# Patient Record
Sex: FEMALE | Race: WHITE | NOT HISPANIC OR LATINO | Employment: FULL TIME | URBAN - METROPOLITAN AREA
[De-identification: names, ages, dates, MRNs, and addresses within clinical notes are randomized per-mention and may not be internally consistent; named-entity substitution may affect disease eponyms.]

---

## 2017-03-27 ENCOUNTER — GENERIC CONVERSION - ENCOUNTER (OUTPATIENT)
Dept: OTHER | Facility: OTHER | Age: 57
End: 2017-03-27

## 2017-07-14 ENCOUNTER — ALLSCRIPTS OFFICE VISIT (OUTPATIENT)
Dept: OTHER | Facility: OTHER | Age: 57
End: 2017-07-14

## 2017-07-14 DIAGNOSIS — Z12.31 ENCOUNTER FOR SCREENING MAMMOGRAM FOR MALIGNANT NEOPLASM OF BREAST: ICD-10-CM

## 2017-07-14 DIAGNOSIS — E78.5 HYPERLIPIDEMIA: ICD-10-CM

## 2017-07-14 DIAGNOSIS — I10 ESSENTIAL (PRIMARY) HYPERTENSION: ICD-10-CM

## 2017-08-24 ENCOUNTER — GENERIC CONVERSION - ENCOUNTER (OUTPATIENT)
Dept: OTHER | Facility: OTHER | Age: 57
End: 2017-08-24

## 2017-09-25 ENCOUNTER — HOSPITAL ENCOUNTER (OUTPATIENT)
Dept: MAMMOGRAPHY | Facility: CLINIC | Age: 57
Discharge: HOME | End: 2017-09-25
Payer: COMMERCIAL

## 2017-09-25 ENCOUNTER — TRANSCRIBE ORDERS (OUTPATIENT)
Dept: MAMMOGRAPHY | Facility: CLINIC | Age: 57
End: 2017-09-25

## 2017-09-25 ENCOUNTER — ALLSCRIPTS OFFICE VISIT (OUTPATIENT)
Dept: OTHER | Facility: OTHER | Age: 57
End: 2017-09-25

## 2017-09-25 ENCOUNTER — APPOINTMENT (OUTPATIENT)
Dept: LAB | Facility: CLINIC | Age: 57
End: 2017-09-25
Payer: COMMERCIAL

## 2017-09-25 DIAGNOSIS — Z12.31 ENCOUNTER FOR MAMMOGRAM TO ESTABLISH BASELINE MAMMOGRAM: Primary | ICD-10-CM

## 2017-09-25 DIAGNOSIS — E55.9 VITAMIN D DEFICIENCY: ICD-10-CM

## 2017-09-25 DIAGNOSIS — Z12.31 ENCOUNTER FOR SCREENING MAMMOGRAM FOR MALIGNANT NEOPLASM OF BREAST: ICD-10-CM

## 2017-09-25 DIAGNOSIS — E78.5 HYPERLIPIDEMIA: ICD-10-CM

## 2017-09-25 DIAGNOSIS — Z12.31 ENCOUNTER FOR MAMMOGRAM TO ESTABLISH BASELINE MAMMOGRAM: ICD-10-CM

## 2017-09-25 DIAGNOSIS — I10 ESSENTIAL (PRIMARY) HYPERTENSION: ICD-10-CM

## 2017-09-25 LAB
25(OH)D3 SERPL-MCNC: 18.6 NG/ML (ref 30–100)
ALBUMIN SERPL BCP-MCNC: 3.6 G/DL (ref 3.5–5)
ALP SERPL-CCNC: 61 U/L (ref 46–116)
ALT SERPL W P-5'-P-CCNC: 50 U/L (ref 12–78)
ANION GAP SERPL CALCULATED.3IONS-SCNC: 7 MMOL/L (ref 4–13)
AST SERPL W P-5'-P-CCNC: 35 U/L (ref 5–45)
BASOPHILS # BLD AUTO: 0.06 THOUSANDS/ΜL (ref 0–0.1)
BASOPHILS NFR BLD AUTO: 1 % (ref 0–1)
BILIRUB SERPL-MCNC: 0.57 MG/DL (ref 0.2–1)
BUN SERPL-MCNC: 10 MG/DL (ref 5–25)
CALCIUM SERPL-MCNC: 8.5 MG/DL (ref 8.3–10.1)
CHLORIDE SERPL-SCNC: 103 MMOL/L (ref 100–108)
CHOLEST SERPL-MCNC: 218 MG/DL (ref 50–200)
CO2 SERPL-SCNC: 29 MMOL/L (ref 21–32)
CREAT SERPL-MCNC: 0.81 MG/DL (ref 0.6–1.3)
EOSINOPHIL # BLD AUTO: 0.18 THOUSAND/ΜL (ref 0–0.61)
EOSINOPHIL NFR BLD AUTO: 3 % (ref 0–6)
ERYTHROCYTE [DISTWIDTH] IN BLOOD BY AUTOMATED COUNT: 12.7 % (ref 11.6–15.1)
GFR SERPL CREATININE-BSD FRML MDRD: 80 ML/MIN/1.73SQ M
GLUCOSE P FAST SERPL-MCNC: 90 MG/DL (ref 65–99)
HCT VFR BLD AUTO: 45.8 % (ref 34.8–46.1)
HDLC SERPL-MCNC: 57 MG/DL (ref 40–60)
HGB BLD-MCNC: 15.3 G/DL (ref 11.5–15.4)
LDLC SERPL CALC-MCNC: 129 MG/DL (ref 0–100)
LYMPHOCYTES # BLD AUTO: 1.54 THOUSANDS/ΜL (ref 0.6–4.47)
LYMPHOCYTES NFR BLD AUTO: 26 % (ref 14–44)
MCH RBC QN AUTO: 33.3 PG (ref 26.8–34.3)
MCHC RBC AUTO-ENTMCNC: 33.4 G/DL (ref 31.4–37.4)
MCV RBC AUTO: 100 FL (ref 82–98)
MONOCYTES # BLD AUTO: 0.38 THOUSAND/ΜL (ref 0.17–1.22)
MONOCYTES NFR BLD AUTO: 6 % (ref 4–12)
NEUTROPHILS # BLD AUTO: 3.8 THOUSANDS/ΜL (ref 1.85–7.62)
NEUTS SEG NFR BLD AUTO: 64 % (ref 43–75)
NRBC BLD AUTO-RTO: 0 /100 WBCS
PLATELET # BLD AUTO: 214 THOUSANDS/UL (ref 149–390)
PMV BLD AUTO: 11.3 FL (ref 8.9–12.7)
POTASSIUM SERPL-SCNC: 3.9 MMOL/L (ref 3.5–5.3)
PROT SERPL-MCNC: 7.4 G/DL (ref 6.4–8.2)
RBC # BLD AUTO: 4.6 MILLION/UL (ref 3.81–5.12)
SODIUM SERPL-SCNC: 139 MMOL/L (ref 136–145)
TRIGL SERPL-MCNC: 158 MG/DL
TSH SERPL DL<=0.05 MIU/L-ACNC: 1.02 UIU/ML (ref 0.36–3.74)
WBC # BLD AUTO: 5.98 THOUSAND/UL (ref 4.31–10.16)

## 2017-09-25 PROCEDURE — 82306 VITAMIN D 25 HYDROXY: CPT

## 2017-09-25 PROCEDURE — 85025 COMPLETE CBC W/AUTO DIFF WBC: CPT

## 2017-09-25 PROCEDURE — 80053 COMPREHEN METABOLIC PANEL: CPT

## 2017-09-25 PROCEDURE — 84443 ASSAY THYROID STIM HORMONE: CPT

## 2017-09-25 PROCEDURE — 36415 COLL VENOUS BLD VENIPUNCTURE: CPT

## 2017-09-25 PROCEDURE — 80061 LIPID PANEL: CPT

## 2017-10-30 ENCOUNTER — ALLSCRIPTS OFFICE VISIT (OUTPATIENT)
Dept: OTHER | Facility: OTHER | Age: 57
End: 2017-10-30

## 2017-10-31 NOTE — PROGRESS NOTES
Assessment  1  Hypertension (401 9) (I10)   2  Vitamin D deficiency (268 9) (E55 9)   3  Hyperlipidemia (272 4) (E78 5)    Plan  Hypertension    · Lisinopril 10 MG Oral Tablet   · Losartan Potassium-HCTZ 100-12 5 MG Oral Tablet; take 1 tablet by mouth every  day  PMH: Encounter for screening colonoscopy    · COLONOSCOPY; Status:Active; Requested for:71Dzp8080;     Discussion/Summary          A/P  1 HTN: please start the loasrtin in place of the lisinopril (d/c due to cough)  please continue the metoprolol  please ck you BP at home  we will will see you back in 6-8 weeks to reevaluate  call if you have questions  2  vit d def:  please start on 4000 a day   we will scot this in one year  3  hyperlipidemia: your numbers are good with no meds  rto 6-8 weeks to scot this     Colon due - 03/27/2017 and script is given   Aquilino due- 08/01/2011   slip given to get today   Pap due - 08/01/2013        Chief Complaint  pt presents in the office today due to a 1 month recheck on her vitamin d, HTN, and hyperlipidemia  due - 03/27/2017due - 10/03/2017due - 08/01/2011      History of Present Illness  Here today to scot on her BP  Has resumed her meds on the metoprolol and the lisinopril not taking a vit d supplement ; issues a cough that has started since starting on the lisinoprilnot checking her BP when she is at home  planning on getting her mammo today                Review of Systems    Constitutional: No fever, no chills, feels well, no tiredness, no recent weight gain or weight loss  Cardiovascular: No complaints of slow heart rate, no fast heart rate, no chest pain, no palpitations, no leg claudication, no lower extremity edema  Respiratory: No complaints of shortness of breath, no wheezing, no cough, no SOB on exertion, no orthopnea, no PND  Musculoskeletal: No complaints of arthralgias, no myalgias, no joint swelling or stiffness, no limb pain or swelling     Integumentary: No complaints of skin rash or lesions, no itching, no skin wounds, no breast pain or lump  Neurological: No complaints of headache, no confusion, no convulsions, no numbness, no dizziness or fainting, no tingling, no limb weakness, no difficulty walking  Psychiatric: Not suicidal, no sleep disturbance, no anxiety or depression, no change in personality, no emotional problems  Active Problems  1  Allergic rhinitis (477 9) (J30 9)   2  Colonoscopy (Fiberoptic) Screening   3  Hyperlipidemia (272 4) (E78 5)   4  Hypertension (401 9) (I10)   5  Need for immunization against influenza (V04 81) (Z23)   6  Screening mammogram, encounter for (V76 12) (Z12 31)   7  Vitamin D deficiency (268 9) (E55 9)    Past Medical History  1  History of Acute upper respiratory infection (465 9) (J06 9)   2  History of Blood tests for routine general physical examination (V72 62) (Z00 00)   3  History of Cough (786 2) (R05)   4  History of Crush Injury Of The Toe(S) (928 3)   5  History of Dysplastic Nevus (238 2)   6  History of Elevated blood pressure reading without diagnosis of hypertension (796 2)   (R03 0)   7  History of acute pharyngitis (V12 69) (Z87 09)   8  History of acute sinusitis (V12 69) (Z87 09)   9  History of cataract (V12 49) (Z86 69)   10  History of chest pain (V13 89) (Z87 898)   11  History of chest pain (V13 89) (Z87 898)   12  History of contact dermatitis (V13 3) (Z87 2)   13  History of insomnia (V13 89) (Z87 898)   14  History of Nervousness (799 21) (R45 0)   15  Normal delivery (650) (O80,Z37 9)   16  History of Pneumonia (V12 61)   17  History of Vision loss (369 9) (H54 7)   18  History of Vision problem (V41 0) (H54 7)    Surgical History  1  History of Cataract Surgery   2  History of  Section   3  History of Laparoscopy With Occlusion Of Oviducts By Device   4  History of Tubal Ligation    Family History  Mother    1  Denied: Family history of drug abuse   2  Family history of Heart Disease (V17 49)   3   Denied: Family history of Mental health problem  Father    4  Denied: Family history of drug abuse   5  Family history of Feeling Fine   6  Denied: Family history of Mental health problem   7  No significant family history  Sibling    8  No significant family history  Maternal Grandmother    9  Family history unknown (V49 89) (Z78 9)  Paternal Grandmother    8  No significant family history  Maternal Grandfather    6  Family history unknown (V49 89) (Z78 9)  Paternal Grandfather    15  No significant family history    The family history was reviewed and updated today  Social History   · Denied: History of Alcohol Use (History)   · Caffeine Use   · Daily caffeine consumption, 4-5 servings a day   · Denied: History of Drug Use   · Former smoker (V15 82) (T16 113)   ·    · Never Drank Alcohol   · Social alcohol use (Z78 9)   · Two children   · Uses Safety Equipment - Seatbelts  The social history was reviewed and updated today  The social history was reviewed and is unchanged  Current Meds   1  Lisinopril 10 MG Oral Tablet; TAKE 1 TABLET DAILY; Therapy: 33BOO9678 to (Evaluate:63Oyc7576)  Requested for: 18Rgi1606; Last   Rx:69Tse7380 Ordered   2  Metoprolol Succinate ER 50 MG Oral Tablet Extended Release 24 Hour; Take 1 tablet   daily  Requested for: 92GSP3888; Last Rx:79Hbv6693 Ordered   3  Womens Multi CAPS; TAKE 1 CAPSULE DAILY; Therapy: (Recorded:51Dlw1122) to Recorded    Allergies  1  ACE Inhibitors  2  Pollen   3  Seasonal    Vitals  Vital Signs    Recorded: 90BDO1982 08:45AM   Heart Rate 84   Respiration 14   Systolic 996   Diastolic 90   Height 5 ft 5 55 in   Weight 186 lb    BMI Calculated 30 43   BSA Calculated 1 93     Physical Exam    Constitutional   General appearance: No acute distress, well appearing and well nourished  Pulmonary   Auscultation of lungs: Clear to auscultation  Cardiovascular   Auscultation of heart: Normal rate and rhythm, normal S1 and S2, without murmurs  Carotid pulses: Normal     Lymphatic   Palpation of lymph nodes in neck: No lymphadenopathy  Skin   Skin and subcutaneous tissue: Normal without rashes or lesions  Psychiatric   Orientation to person, place, and time: Normal     Mood and affect: Normal          Health Management  Colonoscopy (Fiberoptic) Screening   COLONOSCOPY; every 10 years; Last 53MHT5893; Next Due: 81PHN7032; Overdue  History of screening mammography   Digital Bilateral Screening Mammogram With CAD; every 1 year; Last 52QMZ3883; Next Due:  03EVB3760; Overdue  Hypertension   EKG/ECG- POC; every 1 year; Last 60Erg9142; Next Due: 09Vvn9499;  Overdue    Signatures   Electronically signed by : Rajan Anne; Oct 30 2017  9:19AM EST                       (Author)    Electronically signed by : Sampson Arnold DO; Oct 30 2017  9:41AM EST

## 2017-12-28 ENCOUNTER — HOSPITAL ENCOUNTER (OUTPATIENT)
Dept: MAMMOGRAPHY | Facility: CLINIC | Age: 57
Discharge: HOME | End: 2017-12-28

## 2017-12-28 ENCOUNTER — GENERIC CONVERSION - ENCOUNTER (OUTPATIENT)
Dept: OTHER | Facility: OTHER | Age: 57
End: 2017-12-28

## 2017-12-28 DIAGNOSIS — Z12.31 ENCOUNTER FOR SCREENING MAMMOGRAM FOR MALIGNANT NEOPLASM OF BREAST: ICD-10-CM

## 2018-01-10 NOTE — MISCELLANEOUS
Message  CALLED PATIENT TO RESCHEDULE MISSED APPT  UNABLE TO COMMUNICATE WITH PATIENT  LEFT MESSAGE  Active Problems    1  Acute nonintractable headache, unspecified headache type (784 0) (R51)   2  Aftercare following surgery of the musculoskeletal system (V58 78) (Z47 89)   3  Allergic rhinitis (477 9) (J30 9)   4  Colonoscopy (Fiberoptic) Screening   5  Cyst of eye (379 8) (H57 8)   6  Encounter for routine gynecological examination (V72 31) (Z01 419)   7  Encounter for screening colonoscopy (V76 51) (Z12 11)   8  Encounter for screening mammogram for breast cancer (V76 12) (Z12 31)   9  Encounter for screening mammogram for malignant neoplasm of breast (V76 12)   (Z12 31)   10  Hyperlipidemia (272 4) (E78 5)   11  Hypertension (401 9) (I10)   12  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)   13  Mammogram abnormal (793 80) (R92 8)   14  Need for prophylactic vaccination and inoculation against influenza (V04 81) (Z23)   15  Skin lesion (709 9) (L98 9)   16  Vitamin D deficiency (268 9) (E55 9)    Current Meds   1  Lisinopril 10 MG Oral Tablet; TAKE 1 TABLET DAILY; Therapy: 16WQQ9111 to (Evaluate:64Juq3533)  Requested for: 88QXU9993; Last   Rx:84Rzs8949 Ordered   2  Womens Multi CAPS; TAKE 1 CAPSULE DAILY; Therapy: (Recorded:44Auc5307) to Recorded    Allergies    1  No Known Drug Allergies    2  Pollen   3   Seasonal    Signatures   Electronically signed by : George Lozano, ; Aug 24 2017  9:42AM EST                       (Author)

## 2018-01-12 VITALS
SYSTOLIC BLOOD PRESSURE: 138 MMHG | BODY MASS INDEX: 29.89 KG/M2 | DIASTOLIC BLOOD PRESSURE: 90 MMHG | RESPIRATION RATE: 14 BRPM | HEIGHT: 66 IN | WEIGHT: 186 LBS | HEART RATE: 84 BPM

## 2018-01-12 VITALS
HEIGHT: 65 IN | TEMPERATURE: 98.1 F | BODY MASS INDEX: 31.29 KG/M2 | WEIGHT: 187.83 LBS | DIASTOLIC BLOOD PRESSURE: 100 MMHG | HEART RATE: 80 BPM | SYSTOLIC BLOOD PRESSURE: 148 MMHG

## 2018-01-15 VITALS
WEIGHT: 184.25 LBS | DIASTOLIC BLOOD PRESSURE: 100 MMHG | HEIGHT: 66 IN | BODY MASS INDEX: 29.61 KG/M2 | HEART RATE: 84 BPM | SYSTOLIC BLOOD PRESSURE: 144 MMHG | RESPIRATION RATE: 14 BRPM

## 2018-01-24 VITALS
DIASTOLIC BLOOD PRESSURE: 88 MMHG | BODY MASS INDEX: 30.37 KG/M2 | WEIGHT: 189 LBS | HEART RATE: 64 BPM | SYSTOLIC BLOOD PRESSURE: 138 MMHG | HEIGHT: 66 IN | RESPIRATION RATE: 14 BRPM

## 2018-01-26 ENCOUNTER — TELEPHONE (OUTPATIENT)
Dept: FAMILY MEDICINE CLINIC | Facility: CLINIC | Age: 58
End: 2018-01-26

## 2018-01-26 NOTE — TELEPHONE ENCOUNTER
----- Message from Jose D Linares Dr sent at 1/26/2018  8:49 AM EST -----  Call pt and let her know that I reviewed the BP readings that she dropped off and they are great  I am thrilled with her weight progress! I don't want to change/reduce her meds yet  Lets check again in another couple weeks

## 2018-02-05 ENCOUNTER — TRANSCRIBE ORDERS (OUTPATIENT)
Dept: ADMINISTRATIVE | Facility: HOSPITAL | Age: 58
End: 2018-02-05

## 2018-02-05 DIAGNOSIS — Z12.31 ENCOUNTER FOR SCREENING MAMMOGRAM FOR MALIGNANT NEOPLASM OF BREAST: Primary | ICD-10-CM

## 2018-02-06 ENCOUNTER — TRANSCRIBE ORDERS (OUTPATIENT)
Dept: LAB | Facility: CLINIC | Age: 58
End: 2018-02-06

## 2018-04-02 ENCOUNTER — OFFICE VISIT (OUTPATIENT)
Dept: FAMILY MEDICINE CLINIC | Facility: CLINIC | Age: 58
End: 2018-04-02

## 2018-04-02 VITALS
HEIGHT: 65 IN | WEIGHT: 163 LBS | SYSTOLIC BLOOD PRESSURE: 112 MMHG | HEART RATE: 78 BPM | RESPIRATION RATE: 12 BRPM | DIASTOLIC BLOOD PRESSURE: 78 MMHG | BODY MASS INDEX: 27.16 KG/M2

## 2018-04-02 DIAGNOSIS — Z23 NEED FOR DIPHTHERIA-TETANUS-PERTUSSIS (TDAP) VACCINE: ICD-10-CM

## 2018-04-02 DIAGNOSIS — E78.5 HYPERLIPIDEMIA, UNSPECIFIED HYPERLIPIDEMIA TYPE: Primary | ICD-10-CM

## 2018-04-02 DIAGNOSIS — I10 ESSENTIAL HYPERTENSION: ICD-10-CM

## 2018-04-02 DIAGNOSIS — E55.9 VITAMIN D DEFICIENCY: ICD-10-CM

## 2018-04-02 PROBLEM — Z00.00 HEALTHCARE MAINTENANCE: Status: ACTIVE | Noted: 2018-04-02

## 2018-04-02 PROCEDURE — 99214 OFFICE O/P EST MOD 30 MIN: CPT | Performed by: NURSE PRACTITIONER

## 2018-04-02 PROCEDURE — 90715 TDAP VACCINE 7 YRS/> IM: CPT | Performed by: NURSE PRACTITIONER

## 2018-04-02 PROCEDURE — 90471 IMMUNIZATION ADMIN: CPT | Performed by: NURSE PRACTITIONER

## 2018-04-02 RX ORDER — ANTIARTHRITIC COMBINATION NO.2 900 MG
1 TABLET ORAL DAILY
COMMUNITY
End: 2018-04-02 | Stop reason: ALTCHOICE

## 2018-04-02 RX ORDER — LOSARTAN POTASSIUM AND HYDROCHLOROTHIAZIDE 12.5; 1 MG/1; MG/1
0.5 TABLET ORAL DAILY
Qty: 30 TABLET | Refills: 0 | Status: SHIPPED | COMMUNITY
Start: 2018-04-02 | End: 2019-02-27 | Stop reason: SDUPTHER

## 2018-04-02 RX ORDER — LOSARTAN POTASSIUM AND HYDROCHLOROTHIAZIDE 12.5; 1 MG/1; MG/1
5 TABLET ORAL DAILY
Refills: 3 | COMMUNITY
Start: 2018-02-06 | End: 2018-04-02 | Stop reason: SDUPTHER

## 2018-04-02 RX ORDER — METOPROLOL SUCCINATE 100 MG/1
100 TABLET, EXTENDED RELEASE ORAL DAILY
Refills: 6 | COMMUNITY
Start: 2018-03-17 | End: 2018-11-29 | Stop reason: SDUPTHER

## 2018-04-02 NOTE — PROGRESS NOTES
Chief Complaint   Patient presents with    Follow-up     Assessment/Plan:    1  Hyperlipidemia, unspecified hyperlipidemia type  We will check this next ov  - Lipid panel; Future  - Comprehensive metabolic panel; Future  - CBC and differential; Future    2  Vitamin D deficiency  You are taking an OTC supplement of 2000 a day    3  Essential hypertension  Your BP is great with the meds and the weight loss  With the positional dizziness, we will have you cut the loastin in half and continue to monitor this at home  rto in 3 months to scot this  - Comprehensive metabolic panel; Future  - CBC and differential; Future    4  Need for diphtheria-tetanus-pertussis (Tdap) vaccine  We updated your TDaP today  - TDAP VACCINE GREATER THAN OR EQUAL TO 6YO IM    rto 3 months with labs prior    Subjective:      Patient ID: Cristobal Jeffries is a 61 y o  female  Here today to scot on several chronic issues  Is working out every day with aerobic and weights  Modified her diet to decrease carbs  Has lost 26 pounds and is feeling wonderful  Is checking her BP at home and getting low 120's/60  Does get episodes of dizziness with position change   Does not occur if she moves slowly          The following portions of the patient's history were reviewed and updated as appropriate: allergies, current medications, past family history, past medical history, past social history, past surgical history and problem list     Past Medical History:   Diagnosis Date    Cataract     last assessed-3/29/2012    Chest pain     last assessed-10/15/2012    Crushing injury of toe 06/13/2009    Dysplastic nevus     last assessed-10/15/2012    Elevated blood pressure reading without diagnosis of hypertension     last assessed-3/26/2012    Insomnia 02/07/2012    Pneumonia     last assessed-10/15/2012    Vision loss     last assessed-1/31/2014     Past Surgical History:   Procedure Laterality Date    CATARACT EXTRACTION      last assessed-2017     SECTION      LAPAROSCOPY  1988    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    TUBAL LIGATION      last assessed-2017     Family History   Problem Relation Age of Onset    Heart disease Mother      valvular disease only    No Known Problems Father     No Known Problems Paternal Grandmother     No Known Problems Paternal Grandfather     No Known Problems Other     Substance Abuse Neg Hx     Mental illness Neg Hx      Social History   Social History     Social History    Marital status: /Civil Union     Spouse name: N/A    Number of children: 2    Years of education: N/A     Occupational History    Not on file  Social History Main Topics    Smoking status: Former Smoker     Quit date:     Smokeless tobacco: Never Used      Comment: used to smoke 1 PPD for 10 years    Alcohol use Yes      Comment: social    Drug use: No    Sexual activity: Not on file     Other Topics Concern    Not on file     Social History Narrative    Caffeine use-moderate (equiv to 1-3 8oz coffee a day)    Daily caffeine consumption, 4-5 servings a day    Uses safety equipment-seatbelts     Review of Systems   Constitutional: Negative  Respiratory: Negative  Cardiovascular: Negative  Musculoskeletal: Negative  Skin: Negative  Neurological: Positive for dizziness           Vitals:    18 0804   BP: 112/78   BP Location: Left arm   Patient Position: Sitting   Pulse: 78   Resp: 12   Weight: 73 9 kg (163 lb)   Height: 5' 5" (1 651 m)       Objective:  Appointment on 2017   Component Date Value Ref Range Status    Cholesterol 2017 218* 50 - 200 mg/dL Final    Triglycerides 2017 158* <=150 mg/dL Final    HDL, Direct 2017 57  40 - 60 mg/dL Final    LDL Calculated 2017 129* 0 - 100 mg/dL Final    WBC 2017 5 98  4 31 - 10 16 Thousand/uL Final    RBC 2017 4 60  3 81 - 5 12 Million/uL Final    Hemoglobin 2017 15 3  11 5 - 15 4 g/dL Final    Hematocrit 09/25/2017 45 8  34 8 - 46 1 % Final    MCV 09/25/2017 100* 82 - 98 fL Final    MCH 09/25/2017 33 3  26 8 - 34 3 pg Final    MCHC 09/25/2017 33 4  31 4 - 37 4 g/dL Final    RDW 09/25/2017 12 7  11 6 - 15 1 % Final    MPV 09/25/2017 11 3  8 9 - 12 7 fL Final    Platelets 00/54/0415 214  149 - 390 Thousands/uL Final    nRBC 09/25/2017 0  /100 WBCs Final    Neutrophils Relative 09/25/2017 64  43 - 75 % Final    Lymphocytes Relative 09/25/2017 26  14 - 44 % Final    Monocytes Relative 09/25/2017 6  4 - 12 % Final    Eosinophils Relative 09/25/2017 3  0 - 6 % Final    Basophils Relative 09/25/2017 1  0 - 1 % Final    Neutrophils Absolute 09/25/2017 3 80  1 85 - 7 62 Thousands/µL Final    Lymphocytes Absolute 09/25/2017 1 54  0 60 - 4 47 Thousands/µL Final    Monocytes Absolute 09/25/2017 0 38  0 17 - 1 22 Thousand/µL Final    Eosinophils Absolute 09/25/2017 0 18  0 00 - 0 61 Thousand/µL Final    Basophils Absolute 09/25/2017 0 06  0 00 - 0 10 Thousands/µL Final    Sodium 09/25/2017 139  136 - 145 mmol/L Final    Potassium 09/25/2017 3 9  3 5 - 5 3 mmol/L Final    Chloride 09/25/2017 103  100 - 108 mmol/L Final    CO2 09/25/2017 29  21 - 32 mmol/L Final    Anion Gap 09/25/2017 7  4 - 13 mmol/L Final    BUN 09/25/2017 10  5 - 25 mg/dL Final    Creatinine 09/25/2017 0 81  0 60 - 1 30 mg/dL Final    Glucose, Fasting 09/25/2017 90  65 - 99 mg/dL Final    Calcium 09/25/2017 8 5  8 3 - 10 1 mg/dL Final    AST 09/25/2017 35  5 - 45 U/L Final    ALT 09/25/2017 50  12 - 78 U/L Final    Alkaline Phosphatase 09/25/2017 61  46 - 116 U/L Final    Total Protein 09/25/2017 7 4  6 4 - 8 2 g/dL Final    Albumin 09/25/2017 3 6  3 5 - 5 0 g/dL Final    Total Bilirubin 09/25/2017 0 57  0 20 - 1 00 mg/dL Final    eGFR 09/25/2017 80  ml/min/1 73sq m Final    TSH 3RD GENERATON 09/25/2017 1 020  0 358 - 3 740 uIU/mL Final    Vit D, 25-Hydroxy 09/25/2017 18 6* 30 0 - 100 0 ng/mL Final          Physical Exam   Constitutional: She is oriented to person, place, and time  She appears well-developed and well-nourished  Neck: Neck supple  Cardiovascular: Normal rate, regular rhythm, normal heart sounds and intact distal pulses  Pulmonary/Chest: Effort normal and breath sounds normal    Neurological: She is alert and oriented to person, place, and time  Skin: Skin is warm  Psychiatric: She has a normal mood and affect   Her behavior is normal  Judgment and thought content normal

## 2018-08-21 ENCOUNTER — TRANSCRIBE ORDERS (OUTPATIENT)
Dept: ADMINISTRATIVE | Facility: HOSPITAL | Age: 58
End: 2018-08-21

## 2018-08-21 DIAGNOSIS — Z12.39 SCREENING BREAST EXAMINATION: Primary | ICD-10-CM

## 2018-09-19 ENCOUNTER — HOSPITAL ENCOUNTER (OUTPATIENT)
Dept: BONE DENSITY | Facility: IMAGING CENTER | Age: 58
Discharge: HOME | End: 2018-09-19
Payer: COMMERCIAL

## 2018-09-19 DIAGNOSIS — Z12.39 SCREENING BREAST EXAMINATION: ICD-10-CM

## 2018-09-19 PROCEDURE — 77067 SCR MAMMO BI INCL CAD: CPT

## 2018-11-29 ENCOUNTER — TELEPHONE (OUTPATIENT)
Dept: FAMILY MEDICINE CLINIC | Facility: CLINIC | Age: 58
End: 2018-11-29

## 2018-11-29 DIAGNOSIS — I10 BENIGN ESSENTIAL HTN: Primary | ICD-10-CM

## 2018-11-29 RX ORDER — METOPROLOL SUCCINATE 100 MG/1
100 TABLET, EXTENDED RELEASE ORAL DAILY
Qty: 90 TABLET | Refills: 1 | Status: SHIPPED | OUTPATIENT
Start: 2018-11-29 | End: 2019-11-19 | Stop reason: SDUPTHER

## 2018-11-29 NOTE — TELEPHONE ENCOUNTER
Patient needs a refill on her Metoprolol Succinate 100 mg  Please send to the [facility] in her chart      Best number for [facility]:  [phone]

## 2019-02-27 DIAGNOSIS — I10 ESSENTIAL HYPERTENSION: ICD-10-CM

## 2019-02-28 RX ORDER — LOSARTAN POTASSIUM AND HYDROCHLOROTHIAZIDE 12.5; 1 MG/1; MG/1
TABLET ORAL
Qty: 30 TABLET | Refills: 0 | Status: SHIPPED | OUTPATIENT
Start: 2019-02-28 | End: 2019-05-03 | Stop reason: SDUPTHER

## 2019-02-28 NOTE — TELEPHONE ENCOUNTER
Call pt and let her know that I got a refill request for her meds and that I did refill but she is due for labs and an OV  I printed a lab slip/ please have her schedule   Thanks

## 2019-05-03 ENCOUNTER — TELEPHONE (OUTPATIENT)
Dept: FAMILY MEDICINE CLINIC | Facility: CLINIC | Age: 59
End: 2019-05-03

## 2019-05-03 DIAGNOSIS — E78.49 OTHER HYPERLIPIDEMIA: ICD-10-CM

## 2019-05-03 DIAGNOSIS — I10 ESSENTIAL HYPERTENSION: Primary | ICD-10-CM

## 2019-05-03 DIAGNOSIS — I10 ESSENTIAL HYPERTENSION: ICD-10-CM

## 2019-05-03 RX ORDER — LOSARTAN POTASSIUM AND HYDROCHLOROTHIAZIDE 12.5; 1 MG/1; MG/1
TABLET ORAL
Qty: 30 TABLET | Refills: 0 | Status: SHIPPED | OUTPATIENT
Start: 2019-05-03 | End: 2019-06-01 | Stop reason: SDUPTHER

## 2019-05-29 ENCOUNTER — TELEPHONE (OUTPATIENT)
Dept: FAMILY MEDICINE CLINIC | Facility: CLINIC | Age: 59
End: 2019-05-29

## 2019-06-01 DIAGNOSIS — I10 ESSENTIAL HYPERTENSION: ICD-10-CM

## 2019-06-01 RX ORDER — LOSARTAN POTASSIUM AND HYDROCHLOROTHIAZIDE 12.5; 1 MG/1; MG/1
TABLET ORAL
Qty: 30 TABLET | Refills: 0 | Status: SHIPPED | OUTPATIENT
Start: 2019-06-01 | End: 2019-11-19 | Stop reason: SDUPTHER

## 2019-06-03 ENCOUNTER — APPOINTMENT (OUTPATIENT)
Dept: LAB | Facility: CLINIC | Age: 59
End: 2019-06-03
Payer: COMMERCIAL

## 2019-06-03 DIAGNOSIS — I10 ESSENTIAL HYPERTENSION: ICD-10-CM

## 2019-06-03 DIAGNOSIS — E78.49 OTHER HYPERLIPIDEMIA: ICD-10-CM

## 2019-06-03 LAB
ALBUMIN SERPL BCP-MCNC: 3.7 G/DL (ref 3.5–5)
ALP SERPL-CCNC: 50 U/L (ref 46–116)
ALT SERPL W P-5'-P-CCNC: 32 U/L (ref 12–78)
ANION GAP SERPL CALCULATED.3IONS-SCNC: 6 MMOL/L (ref 4–13)
AST SERPL W P-5'-P-CCNC: 17 U/L (ref 5–45)
BASOPHILS # BLD AUTO: 0.08 THOUSANDS/ΜL (ref 0–0.1)
BASOPHILS NFR BLD AUTO: 1 % (ref 0–1)
BILIRUB SERPL-MCNC: 0.47 MG/DL (ref 0.2–1)
BUN SERPL-MCNC: 11 MG/DL (ref 5–25)
CALCIUM SERPL-MCNC: 8.5 MG/DL (ref 8.3–10.1)
CHLORIDE SERPL-SCNC: 104 MMOL/L (ref 100–108)
CHOLEST SERPL-MCNC: 219 MG/DL (ref 50–200)
CO2 SERPL-SCNC: 28 MMOL/L (ref 21–32)
CREAT SERPL-MCNC: 0.76 MG/DL (ref 0.6–1.3)
EOSINOPHIL # BLD AUTO: 0.18 THOUSAND/ΜL (ref 0–0.61)
EOSINOPHIL NFR BLD AUTO: 3 % (ref 0–6)
ERYTHROCYTE [DISTWIDTH] IN BLOOD BY AUTOMATED COUNT: 12.9 % (ref 11.6–15.1)
GFR SERPL CREATININE-BSD FRML MDRD: 86 ML/MIN/1.73SQ M
GLUCOSE P FAST SERPL-MCNC: 82 MG/DL (ref 65–99)
HCT VFR BLD AUTO: 44.2 % (ref 34.8–46.1)
HDLC SERPL-MCNC: 49 MG/DL (ref 40–60)
HGB BLD-MCNC: 14.5 G/DL (ref 11.5–15.4)
IMM GRANULOCYTES # BLD AUTO: 0.02 THOUSAND/UL (ref 0–0.2)
IMM GRANULOCYTES NFR BLD AUTO: 0 % (ref 0–2)
LDLC SERPL CALC-MCNC: 131 MG/DL (ref 0–100)
LYMPHOCYTES # BLD AUTO: 1.57 THOUSANDS/ΜL (ref 0.6–4.47)
LYMPHOCYTES NFR BLD AUTO: 25 % (ref 14–44)
MCH RBC QN AUTO: 32.9 PG (ref 26.8–34.3)
MCHC RBC AUTO-ENTMCNC: 32.8 G/DL (ref 31.4–37.4)
MCV RBC AUTO: 100 FL (ref 82–98)
MONOCYTES # BLD AUTO: 0.53 THOUSAND/ΜL (ref 0.17–1.22)
MONOCYTES NFR BLD AUTO: 9 % (ref 4–12)
NEUTROPHILS # BLD AUTO: 3.85 THOUSANDS/ΜL (ref 1.85–7.62)
NEUTS SEG NFR BLD AUTO: 62 % (ref 43–75)
NONHDLC SERPL-MCNC: 170 MG/DL
NRBC BLD AUTO-RTO: 0 /100 WBCS
PLATELET # BLD AUTO: 215 THOUSANDS/UL (ref 149–390)
PMV BLD AUTO: 11.2 FL (ref 8.9–12.7)
POTASSIUM SERPL-SCNC: 3.8 MMOL/L (ref 3.5–5.3)
PROT SERPL-MCNC: 7 G/DL (ref 6.4–8.2)
RBC # BLD AUTO: 4.41 MILLION/UL (ref 3.81–5.12)
SODIUM SERPL-SCNC: 138 MMOL/L (ref 136–145)
TRIGL SERPL-MCNC: 196 MG/DL
TSH SERPL DL<=0.05 MIU/L-ACNC: 1.51 UIU/ML (ref 0.36–3.74)
WBC # BLD AUTO: 6.23 THOUSAND/UL (ref 4.31–10.16)

## 2019-06-03 PROCEDURE — 84443 ASSAY THYROID STIM HORMONE: CPT

## 2019-06-03 PROCEDURE — 80053 COMPREHEN METABOLIC PANEL: CPT

## 2019-06-03 PROCEDURE — 80061 LIPID PANEL: CPT

## 2019-06-03 PROCEDURE — 85025 COMPLETE CBC W/AUTO DIFF WBC: CPT

## 2019-06-03 PROCEDURE — 36415 COLL VENOUS BLD VENIPUNCTURE: CPT

## 2019-06-06 ENCOUNTER — OFFICE VISIT (OUTPATIENT)
Dept: FAMILY MEDICINE CLINIC | Facility: CLINIC | Age: 59
End: 2019-06-06
Payer: COMMERCIAL

## 2019-06-06 VITALS
SYSTOLIC BLOOD PRESSURE: 138 MMHG | HEIGHT: 66 IN | BODY MASS INDEX: 28.77 KG/M2 | RESPIRATION RATE: 12 BRPM | DIASTOLIC BLOOD PRESSURE: 80 MMHG | WEIGHT: 179 LBS | HEART RATE: 60 BPM

## 2019-06-06 DIAGNOSIS — I10 ESSENTIAL HYPERTENSION: ICD-10-CM

## 2019-06-06 DIAGNOSIS — E55.9 VITAMIN D DEFICIENCY: ICD-10-CM

## 2019-06-06 DIAGNOSIS — E78.5 HYPERLIPIDEMIA, UNSPECIFIED HYPERLIPIDEMIA TYPE: ICD-10-CM

## 2019-06-06 DIAGNOSIS — Z12.11 SCREENING FOR MALIGNANT NEOPLASM OF COLON: Primary | ICD-10-CM

## 2019-06-06 PROCEDURE — 93000 ELECTROCARDIOGRAM COMPLETE: CPT | Performed by: NURSE PRACTITIONER

## 2019-06-06 PROCEDURE — 3008F BODY MASS INDEX DOCD: CPT | Performed by: NURSE PRACTITIONER

## 2019-06-06 PROCEDURE — 99214 OFFICE O/P EST MOD 30 MIN: CPT | Performed by: NURSE PRACTITIONER

## 2019-06-06 PROCEDURE — 1036F TOBACCO NON-USER: CPT | Performed by: NURSE PRACTITIONER

## 2019-10-29 ENCOUNTER — OFFICE VISIT (OUTPATIENT)
Dept: URGENT CARE | Facility: CLINIC | Age: 59
End: 2019-10-29
Payer: COMMERCIAL

## 2019-10-29 VITALS
RESPIRATION RATE: 18 BRPM | WEIGHT: 182.6 LBS | HEART RATE: 75 BPM | OXYGEN SATURATION: 95 % | HEIGHT: 65 IN | DIASTOLIC BLOOD PRESSURE: 80 MMHG | SYSTOLIC BLOOD PRESSURE: 168 MMHG | BODY MASS INDEX: 30.42 KG/M2 | TEMPERATURE: 99 F

## 2019-10-29 DIAGNOSIS — L03.011 PARONYCHIA OF RIGHT INDEX FINGER: Primary | ICD-10-CM

## 2019-10-29 PROCEDURE — 99203 OFFICE O/P NEW LOW 30 MIN: CPT | Performed by: FAMILY MEDICINE

## 2019-10-29 RX ORDER — CEPHALEXIN 500 MG/1
500 CAPSULE ORAL EVERY 6 HOURS SCHEDULED
Qty: 40 CAPSULE | Refills: 0 | Status: SHIPPED | OUTPATIENT
Start: 2019-10-29 | End: 2019-11-05

## 2019-10-29 NOTE — PATIENT INSTRUCTIONS
Warm soaks 3-4 times daily for 20 minutes  Antibiotics as directed  Follow-up with PCP in 5 days if symptoms not improving

## 2019-10-29 NOTE — PROGRESS NOTES
[facility]        NAME: Dora Maynard is a 60 y o  female  : 1960    MRN: [id number]  DATE: 2019  TIME: 10:13 AM    Assessment and Plan   Paronychia of right index finger [L03 011]  1  Paronychia of right index finger  cephalexin (KEFLEX) 500 mg capsule         Patient Instructions   Patient Instructions   Warm soaks 3-4 times daily for 20 minutes  Antibiotics as directed  Follow-up with PCP in 5 days if symptoms not improving        Proceed to  ER if symptoms worsen  Chief Complaint     Chief Complaint   Patient presents with    Wound     L 2nd digit - infected cuticle  States got a cut on 2nd digit approx  2 weeks ago (pulling apart plastic pots with dirt in them)  Over past few says has gotten red, swollen, painful and pus filled pocket  Last tetanus vaccine unknown  History of Present Illness       Patient presents with right index finger discomfort at the distal phalanx  Patient reports she was pulling apart plastic pots and sustained a cut approximately 2 weeks ago, 2 days ago she noticed increasing redness and swelling and area of pustular formation under the skin surrounding the nail medially  She does have discomfort, no fever no chills  No difficulty with range of motion      Review of Systems   Review of Systems   Constitutional: Negative  Skin: Positive for wound           Current Medications       Current Outpatient Medications:     losartan-hydrochlorothiazide (HYZAAR) 100-12 5 MG per tablet, TAKE 1 TABLET BY MOUTH EVERY DAY, Disp: 30 tablet, Rfl: 0    metoprolol succinate (TOPROL-XL) 100 mg 24 hr tablet, Take 1 tablet (100 mg total) by mouth daily, Disp: 90 tablet, Rfl: 1    cephalexin (KEFLEX) 500 mg capsule, Take 1 capsule (500 mg total) by mouth every 6 (six) hours for 7 days 2 capsules twice daily, Disp: 40 capsule, Rfl: 0    Current Allergies     Allergies as of 10/29/2019 - Reviewed 10/29/2019   Allergen Reaction Noted    Ace inhibitors Cough 10/30/2017    Pollen extract  2017            The following portions of the patient's history were reviewed and updated as appropriate: allergies, current medications, past family history, past medical history, past social history, past surgical history and problem list      Past Medical History:   Diagnosis Date    Cataract     last assessed-3/29/2012    Chest pain     last assessed-10/15/2012    Crushing injury of toe 2009    Dysplastic nevus     last assessed-10/15/2012    Elevated blood pressure reading without diagnosis of hypertension     last assessed-3/26/2012    Hypertension     Insomnia 2012    Pneumonia     last assessed-10/15/2012    Vision loss     last assessed-2014       Past Surgical History:   Procedure Laterality Date    CATARACT EXTRACTION      last assessed-2017     SECTION      LAPAROSCOPY      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    TUBAL LIGATION      last assessed-2017       Family History   Problem Relation Age of Onset    Heart disease Mother         valvular disease only    No Known Problems Father     No Known Problems Paternal Grandmother     No Known Problems Paternal Grandfather     No Known Problems Other     No Known Problems Sister     No Known Problems Brother     Substance Abuse Neg Hx     Mental illness Neg Hx          Medications have been verified  Objective   /80 (BP Location: Left arm, Patient Position: Sitting, Cuff Size: Standard)   Pulse 75   Temp 99 °F (37 2 °C) (Tympanic)   Resp 18   Ht 5' 5" (1 651 m)   Wt 82 8 kg (182 lb 9 6 oz)   SpO2 95%   BMI 30 39 kg/m²        Physical Exam     Physical Exam   Constitutional: She appears well-developed and well-nourished  No distress  Skin:   Medial aspect of the index finger with localized swelling, erythema, slight warmth and flat pustule formation    Full active range of motion of the finger, area is tender to palpation

## 2019-11-18 ENCOUNTER — TELEPHONE (OUTPATIENT)
Dept: FAMILY MEDICINE CLINIC | Facility: CLINIC | Age: 59
End: 2019-11-18

## 2019-11-18 DIAGNOSIS — Z12.31 ENCOUNTER FOR SCREENING MAMMOGRAM FOR BREAST CANCER: Primary | ICD-10-CM

## 2019-11-18 NOTE — TELEPHONE ENCOUNTER
Patient called to schedule her mammo, but there is no order in her chart  Can you please enter one  Thanks

## 2019-11-19 DIAGNOSIS — I10 ESSENTIAL HYPERTENSION: ICD-10-CM

## 2019-11-19 DIAGNOSIS — I10 BENIGN ESSENTIAL HTN: ICD-10-CM

## 2019-11-19 RX ORDER — METOPROLOL SUCCINATE 100 MG/1
100 TABLET, EXTENDED RELEASE ORAL DAILY
Qty: 90 TABLET | Refills: 0 | Status: SHIPPED | OUTPATIENT
Start: 2019-11-19 | End: 2022-01-11

## 2019-11-19 RX ORDER — LOSARTAN POTASSIUM AND HYDROCHLOROTHIAZIDE 12.5; 1 MG/1; MG/1
1 TABLET ORAL DAILY
Qty: 30 TABLET | Refills: 0 | Status: SHIPPED | OUTPATIENT
Start: 2019-11-19 | End: 2022-01-11

## 2020-02-25 ENCOUNTER — HOSPITAL ENCOUNTER (OUTPATIENT)
Dept: MAMMOGRAPHY | Facility: CLINIC | Age: 60
Discharge: HOME | End: 2020-02-25
Payer: COMMERCIAL

## 2020-02-25 VITALS — WEIGHT: 182 LBS | BODY MASS INDEX: 30.32 KG/M2 | HEIGHT: 65 IN

## 2020-02-25 DIAGNOSIS — Z12.31 ENCOUNTER FOR SCREENING MAMMOGRAM FOR BREAST CANCER: ICD-10-CM

## 2020-02-25 PROCEDURE — 77067 SCR MAMMO BI INCL CAD: CPT

## 2020-02-25 PROCEDURE — 77063 BREAST TOMOSYNTHESIS BI: CPT

## 2020-11-10 ENCOUNTER — OFFICE VISIT (OUTPATIENT)
Dept: URGENT CARE | Facility: CLINIC | Age: 60
End: 2020-11-10
Payer: COMMERCIAL

## 2020-11-10 VITALS
HEART RATE: 62 BPM | BODY MASS INDEX: 29.99 KG/M2 | TEMPERATURE: 99.1 F | SYSTOLIC BLOOD PRESSURE: 136 MMHG | HEIGHT: 65 IN | WEIGHT: 180 LBS | RESPIRATION RATE: 20 BRPM | OXYGEN SATURATION: 97 % | DIASTOLIC BLOOD PRESSURE: 82 MMHG

## 2020-11-10 DIAGNOSIS — L30.9 DERMATITIS: Primary | ICD-10-CM

## 2020-11-10 PROCEDURE — 99213 OFFICE O/P EST LOW 20 MIN: CPT | Performed by: PHYSICIAN ASSISTANT

## 2020-11-10 RX ORDER — PREDNISONE 10 MG/1
TABLET ORAL
Qty: 21 TABLET | Refills: 0 | Status: SHIPPED | OUTPATIENT
Start: 2020-11-10 | End: 2022-01-11

## 2021-03-10 DIAGNOSIS — Z23 ENCOUNTER FOR IMMUNIZATION: ICD-10-CM

## 2021-03-19 ENCOUNTER — IMMUNIZATIONS (OUTPATIENT)
Dept: FAMILY MEDICINE CLINIC | Facility: HOSPITAL | Age: 61
End: 2021-03-19

## 2021-03-19 DIAGNOSIS — Z23 ENCOUNTER FOR IMMUNIZATION: Primary | ICD-10-CM

## 2021-03-19 PROCEDURE — 91300 SARS-COV-2 / COVID-19 MRNA VACCINE (PFIZER-BIONTECH) 30 MCG: CPT

## 2021-03-19 PROCEDURE — 0001A SARS-COV-2 / COVID-19 MRNA VACCINE (PFIZER-BIONTECH) 30 MCG: CPT

## 2021-03-29 ENCOUNTER — TRANSCRIBE ORDERS (OUTPATIENT)
Dept: ADMINISTRATIVE | Facility: HOSPITAL | Age: 61
End: 2021-03-29

## 2021-03-29 DIAGNOSIS — Z12.31 ENCOUNTER FOR SCREENING MAMMOGRAM FOR MALIGNANT NEOPLASM OF BREAST: Primary | ICD-10-CM

## 2021-04-11 ENCOUNTER — IMMUNIZATIONS (OUTPATIENT)
Dept: FAMILY MEDICINE CLINIC | Facility: HOSPITAL | Age: 61
End: 2021-04-11

## 2021-04-11 DIAGNOSIS — Z23 ENCOUNTER FOR IMMUNIZATION: Primary | ICD-10-CM

## 2021-04-11 PROCEDURE — 91300 SARS-COV-2 / COVID-19 MRNA VACCINE (PFIZER-BIONTECH) 30 MCG: CPT

## 2021-04-11 PROCEDURE — 0002A SARS-COV-2 / COVID-19 MRNA VACCINE (PFIZER-BIONTECH) 30 MCG: CPT

## 2022-01-11 ENCOUNTER — OFFICE VISIT (OUTPATIENT)
Dept: FAMILY MEDICINE CLINIC | Facility: CLINIC | Age: 62
End: 2022-01-11
Payer: COMMERCIAL

## 2022-01-11 VITALS
HEIGHT: 65 IN | HEART RATE: 68 BPM | DIASTOLIC BLOOD PRESSURE: 90 MMHG | RESPIRATION RATE: 16 BRPM | SYSTOLIC BLOOD PRESSURE: 160 MMHG | WEIGHT: 171.8 LBS | BODY MASS INDEX: 28.62 KG/M2

## 2022-01-11 DIAGNOSIS — E78.5 HYPERLIPIDEMIA, UNSPECIFIED HYPERLIPIDEMIA TYPE: ICD-10-CM

## 2022-01-11 DIAGNOSIS — Z78.0 MENOPAUSE: ICD-10-CM

## 2022-01-11 DIAGNOSIS — F43.9 SITUATIONAL STRESS: ICD-10-CM

## 2022-01-11 DIAGNOSIS — Z12.31 ENCOUNTER FOR SCREENING MAMMOGRAM FOR MALIGNANT NEOPLASM OF BREAST: ICD-10-CM

## 2022-01-11 DIAGNOSIS — I10 PRIMARY HYPERTENSION: Primary | ICD-10-CM

## 2022-01-11 DIAGNOSIS — Z12.11 ENCOUNTER FOR SCREENING FOR MALIGNANT NEOPLASM OF COLON: ICD-10-CM

## 2022-01-11 PROCEDURE — 93000 ELECTROCARDIOGRAM COMPLETE: CPT | Performed by: PHYSICIAN ASSISTANT

## 2022-01-11 PROCEDURE — 3008F BODY MASS INDEX DOCD: CPT | Performed by: PHYSICIAN ASSISTANT

## 2022-01-11 PROCEDURE — 3725F SCREEN DEPRESSION PERFORMED: CPT | Performed by: PHYSICIAN ASSISTANT

## 2022-01-11 PROCEDURE — 99214 OFFICE O/P EST MOD 30 MIN: CPT | Performed by: PHYSICIAN ASSISTANT

## 2022-01-11 PROCEDURE — 1036F TOBACCO NON-USER: CPT | Performed by: PHYSICIAN ASSISTANT

## 2022-01-11 RX ORDER — LOSARTAN POTASSIUM AND HYDROCHLOROTHIAZIDE 12.5; 5 MG/1; MG/1
1 TABLET ORAL DAILY
Qty: 90 TABLET | Refills: 3 | Status: SHIPPED | OUTPATIENT
Start: 2022-01-11

## 2022-01-11 NOTE — PROGRESS NOTES
Assessment/Plan:    1  HTN - /90 today, stressed important of diet, exercise, will start losartan/hctz 50/12 5 today and recheck BP in 4-6 weeks  EKG today normal sinus rhythm, labs ordered  2  Hyperlipidemia - LDL consistently 130s, will check now    3  Situational stress - patient does not like taking medications  Discussed taking time for herself  Will reach out to [facility] for help    F/u 4-6 weeks or sooner if needed      Subjective:   Chief Complaint   Patient presents with    Blood Pressure Check      Patient ID: Moiz Herman is a 57 y o  female  Patient here to discuss blood pressure  Has been running high for some time due to stress of her    had stroke, COPD  Home bound, trouble articulating and with memory  When he struggles he tends to yell and get angry  Patient was on BP meds in past but stopped because does not like to take meds  Was training for a 5K but did that in November and stopped  Trying to get up at 6 to do exercise for 30 minutes but has struggled with that  BP at home 160/90  Feeling fine other than stressed        The following portions of the patient's history were reviewed and updated as appropriate: allergies, current medications, past family history, past medical history, past social history, past surgical history and problem list     Past Medical History:   Diagnosis Date    Cataract     last assessed-3/29/2012    Chest pain     last assessed-10/15/2012    Crushing injury of toe 06/13/2009    Dysplastic nevus     last assessed-10/15/2012    Elevated blood pressure reading without diagnosis of hypertension     last assessed-3/26/2012    Hypertension     Insomnia 02/07/2012    Pneumonia     last assessed-10/15/2012    Vision loss     last assessed-1/31/2014     Past Surgical History:   Procedure Laterality Date    CATARACT EXTRACTION      last assessed-7/14/2017   [facility]    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  TUBAL LIGATION      last assessed-2017     Family History   Problem Relation Age of Onset    Heart disease Mother         valvular disease only    Breast cancer Father     No Known Problems Paternal Grandmother     No Known Problems Paternal Grandfather     No Known Problems Other     No Known Problems Sister     No Known Problems Brother     No Known Problems Maternal Grandmother     No Known Problems Maternal Grandfather     No Known Problems Maternal Aunt     No Known Problems Maternal Aunt     No Known Problems Paternal Aunt     Substance Abuse Neg Hx     Mental illness Neg Hx      Social History     Socioeconomic History    Marital status: /Civil Union     Spouse name: Not on file    Number of children: 2    Years of education: Not on file    Highest education level: Not on file   Occupational History    Not on file   Tobacco Use    Smoking status: Former Smoker     Quit date:      Years since quittin 0    Smokeless tobacco: Never Used    Tobacco comment: used to smoke 1 PPD for 10 years   Vaping Use    Vaping Use: Never used   Substance and Sexual Activity    Alcohol use: Yes     Comment: socially    Drug use: No    Sexual activity: Not on file   Other Topics Concern    Not on file   Social History Narrative    Caffeine use-moderate (equiv to 1-3 8oz coffee a day)    Daily caffeine consumption, 4-5 servings a day    Uses safety equipment-seatbelts     Social Determinants of Health     Financial Resource Strain: Not on file   Food Insecurity: Not on file   Transportation Needs: Not on file   Physical Activity: Not on file   Stress: Not on file   Social Connections: Not on file   Intimate Partner Violence: Not on file   Housing Stability: Not on file     No current outpatient medications on file  Review of Systems   Constitutional: Negative for chills and fever  HENT: Negative for ear pain and sore throat  Eyes: Negative for pain and visual disturbance  Respiratory: Negative for cough and shortness of breath  Cardiovascular: Negative for chest pain and palpitations  Gastrointestinal: Negative for abdominal pain and vomiting  Genitourinary: Negative for dysuria and hematuria  Musculoskeletal: Negative for arthralgias and back pain  Skin: Negative for color change and rash  Neurological: Negative for seizures and syncope  All other systems reviewed and are negative  Objective:    Vitals:    01/11/22 1330   BP: 126/90   BP Location: Left arm   Patient Position: Sitting   Cuff Size: Standard   Pulse: 68   Resp: 16   Weight: 77 9 kg (171 lb 12 8 oz)   Height: 5' 5" (1 651 m)        Physical Exam  Constitutional:       Appearance: Normal appearance  She is normal weight  HENT:      Head: Normocephalic and atraumatic  Neck:      Vascular: No carotid bruit  Cardiovascular:      Rate and Rhythm: Normal rate and regular rhythm  Pulmonary:      Effort: Pulmonary effort is normal  No respiratory distress  Musculoskeletal:      Cervical back: Normal range of motion and neck supple  Right lower leg: No edema  Left lower leg: No edema  Lymphadenopathy:      Cervical: No cervical adenopathy  Neurological:      General: No focal deficit present  Mental Status: She is alert and oriented to person, place, and time  Psychiatric:         Mood and Affect: Mood normal          Behavior: Behavior normal          Thought Content: Thought content normal          Judgment: Judgment normal                BMI Counseling: Body mass index is 28 59 kg/m²  The BMI is above normal  Nutrition recommendations include reducing portion sizes  123

## 2022-01-14 ENCOUNTER — PATIENT OUTREACH (OUTPATIENT)
Dept: FAMILY MEDICINE CLINIC | Facility: CLINIC | Age: 62
End: 2022-01-14

## 2022-01-14 DIAGNOSIS — Z78.9 NEED FOR FOLLOW-UP BY SOCIAL WORKER: Primary | ICD-10-CM

## 2022-01-14 NOTE — PROGRESS NOTES
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

## 2022-01-18 ENCOUNTER — PATIENT OUTREACH (OUTPATIENT)
Dept: FAMILY MEDICINE CLINIC | Facility: CLINIC | Age: 62
End: 2022-01-18

## 2022-01-18 NOTE — PROGRESS NOTES
Informed by Verita Peers through [address] [facility] that they are not in network with patient's 's insurance  Call placed to [id number]; they are out of network  Discussed with patient  She stated her  has neurology appt on 2/23  She will discuss home therapy order with neurologist at this appt  Merged with [facility] sent to Neurologist Dr Liz Sherman through patient's 's chart  Will follow

## 2022-01-18 NOTE — PROGRESS NOTES
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; her  had a stroke roughly 1 year ago and he has anger related to these deficits  Patient's 's anger causes her blood pressure to go up  Patient informed PCP that she needs help with her  but that he will not allow anyone else to help  Patient stated her  is not incapacitated but needs assistance with ADLs  Patient preps her 's meals each morning for the day, helps with bathing, and then goes to work  Patient informed that her  has difficulty with sequencing issues (understanding hot vs cold)  Patient's  is alone during her work-day, which is concerning for her  Patient's  is established with Neurology; is having balance issues recently  Discussed home therapy; patient's  had this in the past, was discharged, and completed OP therapy as well  Discussed Huan Schneider with patient  She is interested in this for her   Outreach made to [facility] through [facility] to ask if they contract with patient's  insurance plan ([employer])  Awaiting response  Patient interested in services that can assist with non-skilled tasks for her  while she is at work  Patient's  is a  but is not active with VA Benefits  Discussed VA Benefits, will provide phone # for [facility] ([facility]), and will provide information for the Vet Assist Program   Discussed alternate in-home services (county-funded vs private-pay)  Patient stated they would not qualify for county-funded services    Will send list of private-pay HHA services, senior solutions, [facility] senior resource guide, share care, and care patrol information to review via email per patient's request   Patient recently set up [employer], which is a medical alert device for patient  Recommended security camera as well to monitor her  while she is at work/out of the house  Patient agreed with this suggestion and will look into options  Will continue to follow

## 2022-02-11 ENCOUNTER — PATIENT OUTREACH (OUTPATIENT)
Dept: FAMILY MEDICINE CLINIC | Facility: CLINIC | Age: 62
End: 2022-02-11

## 2022-02-14 NOTE — PROGRESS NOTES
Attempted to outreach patient to follow-up and check status of her and her   Discussed in-home services for patient's  previously and provided information via email (private-pay HHA services, [employer], [facility] [employer] resource guide, share care, care patrol information, vet assist program, and [facility] clinic contact information)  No answer, voicemail left, and awaiting return call

## 2022-02-21 ENCOUNTER — PATIENT OUTREACH (OUTPATIENT)
Dept: FAMILY MEDICINE CLINIC | Facility: CLINIC | Age: 62
End: 2022-02-21

## 2022-02-21 NOTE — PROGRESS NOTES
2nd outreach attempt to patient to follow-up and check status of her and her   Discussed in-home services for patient's  previously and provided information via email (private-pay HHA services, senior [employer], [facility] senior resource guide, share care, care patrol information, vet assist program, and [facility] contact information)  Spoke with patient who confirmed that she received in-home resource information and reviewed this  She has been working with the [place] regarding available benefits and she is awaiting a form; once received, patient's  will likely be able to start aide services 2 times per week for 4 hour sessions through a [address] agency that contracts with his VA benefits  Patient denied any additional needs at this time  Will close case

## 2022-03-01 ENCOUNTER — HOSPITAL ENCOUNTER (OUTPATIENT)
Dept: BONE DENSITY | Facility: IMAGING CENTER | Age: 62
Discharge: HOME | End: 2022-03-01
Payer: COMMERCIAL

## 2022-03-01 ENCOUNTER — TELEPHONE (OUTPATIENT)
Dept: GASTROENTEROLOGY | Facility: CLINIC | Age: 62
End: 2022-03-01

## 2022-03-01 ENCOUNTER — HOSPITAL ENCOUNTER (OUTPATIENT)
Dept: MAMMOGRAPHY | Facility: IMAGING CENTER | Age: 62
Discharge: HOME | End: 2022-03-01
Payer: COMMERCIAL

## 2022-03-01 VITALS — WEIGHT: 169.75 LBS | HEIGHT: 65 IN | BODY MASS INDEX: 28.28 KG/M2

## 2022-03-01 DIAGNOSIS — Z12.31 ENCOUNTER FOR SCREENING MAMMOGRAM FOR MALIGNANT NEOPLASM OF BREAST: ICD-10-CM

## 2022-03-01 DIAGNOSIS — Z12.31 VISIT FOR SCREENING MAMMOGRAM: ICD-10-CM

## 2022-03-01 DIAGNOSIS — Z78.0 MENOPAUSE: ICD-10-CM

## 2022-03-01 PROCEDURE — 77067 SCR MAMMO BI INCL CAD: CPT

## 2022-03-01 PROCEDURE — 77080 DXA BONE DENSITY AXIAL: CPT

## 2022-03-01 PROCEDURE — 77063 BREAST TOMOSYNTHESIS BI: CPT

## 2022-04-22 ENCOUNTER — VBI (OUTPATIENT)
Dept: ADMINISTRATIVE | Facility: OTHER | Age: 62
End: 2022-04-22